# Patient Record
Sex: MALE | Race: WHITE | NOT HISPANIC OR LATINO | Employment: OTHER | URBAN - METROPOLITAN AREA
[De-identification: names, ages, dates, MRNs, and addresses within clinical notes are randomized per-mention and may not be internally consistent; named-entity substitution may affect disease eponyms.]

---

## 2019-05-18 ENCOUNTER — HOSPITAL ENCOUNTER (EMERGENCY)
Facility: HOSPITAL | Age: 74
Discharge: HOME | End: 2019-05-18
Attending: EMERGENCY MEDICINE
Payer: MEDICARE

## 2019-05-18 VITALS
BODY MASS INDEX: 26.98 KG/M2 | RESPIRATION RATE: 20 BRPM | WEIGHT: 178 LBS | DIASTOLIC BLOOD PRESSURE: 65 MMHG | OXYGEN SATURATION: 94 % | HEART RATE: 76 BPM | TEMPERATURE: 98 F | HEIGHT: 68 IN | SYSTOLIC BLOOD PRESSURE: 134 MMHG

## 2019-05-18 DIAGNOSIS — S61.210A LACERATION OF RIGHT INDEX FINGER WITHOUT FOREIGN BODY WITHOUT DAMAGE TO NAIL, INITIAL ENCOUNTER: Primary | ICD-10-CM

## 2019-05-18 PROCEDURE — 12002 RPR S/N/AX/GEN/TRNK2.6-7.5CM: CPT | Mod: F6,,, | Performed by: PHYSICIAN ASSISTANT

## 2019-05-18 PROCEDURE — 12002 RPR S/N/AX/GEN/TRNK2.6-7.5CM: CPT | Mod: F6

## 2019-05-18 PROCEDURE — 99281 EMR DPT VST MAYX REQ PHY/QHP: CPT | Mod: 25

## 2019-05-18 PROCEDURE — 90715 TDAP VACCINE 7 YRS/> IM: CPT | Performed by: EMERGENCY MEDICINE

## 2019-05-18 PROCEDURE — 12002 PR RESUP NPTERF WND BODY 2.6-7.5 CM: ICD-10-PCS | Mod: F6,,, | Performed by: PHYSICIAN ASSISTANT

## 2019-05-18 PROCEDURE — 99282 EMERGENCY DEPT VISIT SF MDM: CPT | Mod: 25

## 2019-05-18 PROCEDURE — 99284 EMERGENCY DEPT VISIT MOD MDM: CPT | Mod: 25,,, | Performed by: PHYSICIAN ASSISTANT

## 2019-05-18 PROCEDURE — 25000003 PHARM REV CODE 250: Performed by: PHYSICIAN ASSISTANT

## 2019-05-18 PROCEDURE — 99284 PR EMERGENCY DEPT VISIT,LEVEL IV: ICD-10-PCS | Mod: 25,,, | Performed by: PHYSICIAN ASSISTANT

## 2019-05-18 PROCEDURE — 90471 IMMUNIZATION ADMIN: CPT | Performed by: EMERGENCY MEDICINE

## 2019-05-18 PROCEDURE — 63600175 PHARM REV CODE 636 W HCPCS: Performed by: EMERGENCY MEDICINE

## 2019-05-18 RX ORDER — PRAMIPEXOLE DIHYDROCHLORIDE 0.5 MG/1
0.5 TABLET ORAL 3 TIMES DAILY
COMMUNITY

## 2019-05-18 RX ORDER — GABAPENTIN 100 MG/1
100 CAPSULE ORAL 3 TIMES DAILY
COMMUNITY

## 2019-05-18 RX ORDER — LIDOCAINE HYDROCHLORIDE 10 MG/ML
10 INJECTION INFILTRATION; PERINEURAL
Status: COMPLETED | OUTPATIENT
Start: 2019-05-18 | End: 2019-05-18

## 2019-05-18 RX ORDER — CARBIDOPA AND LEVODOPA 25; 250 MG/1; MG/1
1 TABLET, ORALLY DISINTEGRATING ORAL 3 TIMES DAILY
COMMUNITY

## 2019-05-18 RX ADMIN — LIDOCAINE HYDROCHLORIDE 1 ML: 10 INJECTION, SOLUTION INFILTRATION; PERINEURAL at 04:05

## 2019-05-18 RX ADMIN — CLOSTRIDIUM TETANI TOXOID ANTIGEN (FORMALDEHYDE INACTIVATED), CORYNEBACTERIUM DIPHTHERIAE TOXOID ANTIGEN (FORMALDEHYDE INACTIVATED), BORDETELLA PERTUSSIS TOXOID ANTIGEN (GLUTARALDEHYDE INACTIVATED), BORDETELLA PERTUSSIS FILAMENTOUS HEMAGGLUTININ ANTIGEN (FORMALDEHYDE INACTIVATED), BORDETELLA PERTUSSIS PERTACTIN ANTIGEN, AND BORDETELLA PERTUSSIS FIMBRIAE 2/3 ANTIGEN 0.5 ML: 5; 2; 2.5; 5; 3; 5 INJECTION, SUSPENSION INTRAMUSCULAR at 04:05

## 2019-05-18 NOTE — ED PROVIDER NOTES
Encounter Date: 5/18/2019       History     Chief Complaint   Patient presents with    Laceration     r  index finger, keeps bleeding on eliquis     Mr Flores is a 72 yo male that presents to the ED with laceration to right index finger. Pt reports that he cut his finger on the sharp edge of screen door handle. Pt is on eliquis and could not control bleeding at home. Pt has 3 cm laceration to radial side of right index finger.  Unable to recall last tetanus shot. Denies any fevers, chills, body aches, headaches, dizziness, light-headedness, chest pain, shortness of breath, abdominal pain, N/V/D, urinary symptoms.         Review of patient's allergies indicates:  No Known Allergies  Past Medical History:   Diagnosis Date    Blood clot in vein     Emphysema lung     Neuropathy     Subdural hematoma      Past Surgical History:   Procedure Laterality Date    BRAIN SURGERY      FOOT SURGERY       History reviewed. No pertinent family history.  Social History     Tobacco Use    Smoking status: Never Smoker    Smokeless tobacco: Never Used   Substance Use Topics    Alcohol use: Never     Frequency: Never    Drug use: Never     Review of Systems   Constitutional: Negative for chills and fever.   HENT: Negative for congestion, rhinorrhea and sore throat.    Eyes: Negative for visual disturbance.   Respiratory: Negative for cough and shortness of breath.    Cardiovascular: Negative for chest pain and leg swelling.   Gastrointestinal: Negative for abdominal pain, diarrhea, nausea and vomiting.   Genitourinary: Negative for dysuria, flank pain and frequency.   Musculoskeletal: Negative for back pain and neck pain.   Skin: Positive for wound.   Neurological: Negative for dizziness, syncope, light-headedness and headaches.   Hematological: Bruises/bleeds easily (on eliquis).   Psychiatric/Behavioral: Negative for confusion.       Physical Exam     Initial Vitals [05/18/19 1523]   BP Pulse Resp Temp SpO2   134/65 76 20 98  °F (36.7 °C) (!) 94 %      MAP       --         Physical Exam    Constitutional: He appears well-developed and well-nourished. He is cooperative.  Non-toxic appearance. No distress.   HENT:   Head: Normocephalic and atraumatic.   Eyes: Pupils are equal, round, and reactive to light.   Neck: Normal range of motion. Neck supple.   Cardiovascular: Normal rate. Exam reveals no gallop and no friction rub.    No murmur heard.  Pulmonary/Chest: Effort normal. No respiratory distress. He has no decreased breath sounds. He has no wheezes. He has no rhonchi. He has no rales.   Abdominal: Soft. There is no tenderness.   Musculoskeletal: Normal range of motion.        Left hand: He exhibits deformity and laceration. He exhibits normal range of motion and no swelling. Normal sensation noted.        Hands:  Neurological: He is alert and oriented to person, place, and time.   Skin: Skin is warm and dry.         ED Course   Lac Repair  Date/Time: 5/18/2019 5:00 PM  Performed by: Alec Claudio PA-C  Authorized by: Zhanna Desai MD   Body area: upper extremity  Location details: right index finger  Laceration length: 3 cm  Foreign bodies: no foreign bodies  Anesthesia: local infiltration    Anesthesia:  Local Anesthetic: lidocaine 1% without epinephrine  Anesthetic total: 5 mL  Preparation: Patient was prepped and draped in the usual sterile fashion.  Irrigation solution: saline  Irrigation method: jet lavage  Amount of cleaning: extensive  Skin closure: Ethilon (5-0)  Number of sutures: 3  Technique: simple  Approximation: close  Approximation difficulty: simple  Dressing: non-stick sterile dressing and pressure dressing  Patient tolerance: Patient tolerated the procedure well with no immediate complications        Labs Reviewed - No data to display       Imaging Results    None          Medical Decision Making:   Initial Assessment:   Mr Flores is a 74 yo male that presents to the ED with laceration to right index  finger. Pt reports that he cut his finger on the sharp edge of screen door handle. Pt is on eliquis and could not control bleeding at home. Pt has 3 cm laceration to radial side of right index finger.  Unable to recall last tetanus shot.  ED Management:  Pt hemodynamically stable. Tdap given. Laceration irrigated copiously with NS. No foreign bodies. Laceration repaired nad patient tolerated procedure without complications. Pressure dressing applied. Plan is to discharge patient and have him follow up with PCP in ten days for suture removal. Pt instructed to return if he develops increased bleeding, pain or any signs of infection. He was discharged with no new prescriptions.  He will follow up with PCP.  All of the patient's questions were answered.  I reviewed the patient's chart and discussed the case with my supervising physician.               Attending Attestation:     Physician Attestation Statement for NP/PA:   I have conducted a face to face encounter with this patient in addition to the NP/PA, due to Medical Complexity    Other NP/PA Attestation Additions:    History of Present Illness: 73-year-old man complains of right index finger laceration that he sustained earlier today on the edge of a screen gait handle.  Does not recall his last tetanus shot.   Physical Exam: There is a 3 cm curvilinear laceration on the radial edge of the proximal index finger near the MCP.  It is oozing blood.  Patient is distally neurovascularly intact. Motor function is intact.   Medical Decision Making: Tetanus was updated.  Laceration was repaired by the PA as documented above.  There was 1 area of the laceration that continue to ooze after laceration repair.  Pressure was held and a pressure dressing was placed.  It was recommended that he keep the dressing on until 24 hr from now.                    Clinical Impression:       ICD-10-CM ICD-9-CM   1. Laceration of right index finger without foreign body without damage to  nail, initial encounter S61.210A 883.0         Disposition:   Disposition: Discharged  Condition: Stable                        Alec Claudio PA-C  05/19/19 0153

## 2019-05-18 NOTE — ED NOTES
Patient identifiers verified and correct for Woo Flores  LOC: The patient is awake, alert and aware of environment with an appropriate affect, the patient is oriented x 3 and speaking appropriately.   APPEARANCE: Patient appears comfortable and in no acute distress, patient is clean and well groomed.  SKIN: The skin is warm and dry, color consistent with ethnicity, patient has normal skin turgor and moist mucus membranes, Laceration to R index finger noted  MUSCULOSKELETAL: Patient moving all extremities spontaneously, no swelling noted.  RESPIRATORY: Airway is open and patent, respirations are spontaneous, patient has a normal effort and rate, no accessory muscle use noted, O2 sats noted at 94% on room air.  CARDIAC:Denies chest pain capillary refill < 3 seconds.   GASTRO: Soft and non tender to palpation, no distention noted, normoactive bowel sounds present in all four quadrants. Pt states bowel movements have been regular.  : Pt denies any pain or frequency with urination.  NEURO: Pt opens eyes spontaneously, behavior appropriate to situation, follows commands, facial expression symmetrical, bilateral hand grasp equal and even, purposeful motor response noted, normal sensation in all extremities when touched with a finger.

## 2019-05-18 NOTE — DISCHARGE INSTRUCTIONS
Please follow up with your PCP in 10 days for wound check and suture removal. Please return to the Emergency Department if you develop increased pain, bleeding, fevers, chills, body aches or any additional concerning symptoms.

## 2019-05-18 NOTE — ED TRIAGE NOTES
Patient reports to the ED today with reports of R index finger laceration. Patient states that he cut his finger on a door latch. Patient on eliquis and states that it wont quit bleeding. Bleeding controlled at this time

## 2019-05-18 NOTE — ED NOTES
Discharge home with family, states understanding to follow up as directed. Ambulates out of ED without difficulty. Extra supplies given.

## 2020-01-01 ENCOUNTER — HOSPITAL ENCOUNTER (EMERGENCY)
Facility: HOSPITAL | Age: 75
End: 2020-07-01
Attending: EMERGENCY MEDICINE
Payer: MEDICARE

## 2020-01-01 VITALS — RESPIRATION RATE: 124 BRPM

## 2020-01-01 DIAGNOSIS — I46.9 CARDIAC ARREST: Primary | ICD-10-CM

## 2020-01-01 PROCEDURE — 99291 PR CRITICAL CARE, E/M 30-74 MINUTES: ICD-10-PCS | Mod: ,,, | Performed by: EMERGENCY MEDICINE

## 2020-01-01 PROCEDURE — 94770 HC EXHALED C02 TEST: CPT

## 2020-01-01 PROCEDURE — 99291 CRITICAL CARE FIRST HOUR: CPT | Mod: ,,, | Performed by: EMERGENCY MEDICINE

## 2020-01-01 PROCEDURE — 99291 CRITICAL CARE FIRST HOUR: CPT | Mod: 25

## 2020-01-01 PROCEDURE — 63600175 PHARM REV CODE 636 W HCPCS: Performed by: EMERGENCY MEDICINE

## 2020-01-01 PROCEDURE — 99900035 HC TECH TIME PER 15 MIN (STAT)

## 2020-01-01 PROCEDURE — 25000003 PHARM REV CODE 250: Performed by: EMERGENCY MEDICINE

## 2020-01-01 RX ORDER — EPINEPHRINE 0.1 MG/ML
INJECTION INTRAVENOUS CODE/TRAUMA/SEDATION MEDICATION
Status: COMPLETED | OUTPATIENT
Start: 2020-01-01 | End: 2020-01-01

## 2020-01-01 RX ORDER — CALCIUM CHLORIDE INJECTION 100 MG/ML
INJECTION, SOLUTION INTRAVENOUS CODE/TRAUMA/SEDATION MEDICATION
Status: COMPLETED | OUTPATIENT
Start: 2020-01-01 | End: 2020-01-01

## 2020-01-01 RX ORDER — SODIUM BICARBONATE 1 MEQ/ML
SYRINGE (ML) INTRAVENOUS CODE/TRAUMA/SEDATION MEDICATION
Status: COMPLETED | OUTPATIENT
Start: 2020-01-01 | End: 2020-01-01

## 2020-01-01 RX ADMIN — EPINEPHRINE 1 MG: 0.1 INJECTION, SOLUTION ENDOTRACHEAL; INTRACARDIAC; INTRAVENOUS at 05:07

## 2020-01-01 RX ADMIN — EPINEPHRINE 1 MG: 0.1 INJECTION, SOLUTION ENDOTRACHEAL; INTRACARDIAC; INTRAVENOUS at 06:07

## 2020-01-01 RX ADMIN — SODIUM BICARBONATE 50 MEQ: 84 INJECTION, SOLUTION INTRAVENOUS at 05:07

## 2020-01-01 RX ADMIN — CALCIUM CHLORIDE 1 G: 100 INJECTION, SOLUTION INTRAVENOUS at 05:07

## 2020-07-01 NOTE — CARE UPDATE
Pt arrived by EMS with chest compressions being performed, LMA in place and ventilated with ambu bag. Continued ventilating with ambu bag while chest compressions performed. EtCO2 ranging from 12-33. Pt became asystole and TOD announced by MD.

## 2020-07-01 NOTE — ED PROVIDER NOTES
Encounter Date: 7/1/2020       History     Chief Complaint   Patient presents with    Unresponsive     Pt presents to ER via EMS for unresponsive. Wife found patient at 0100 unresponsive. EMS arrived and patient was breathing with palpable pulse; they lost pulse in route. He presented on LUCUS; ems gave 2 epi and 1 bicarb PTA.     74-year-old male with a history of COPD presents via EMS in cardiac arrest.  Patient was unresponsive at home.  He wears oxygen device that was apparently disconnected.  EMS began CPR and administered epinephrine twice.  Upon arrival in the ED patient is pulseless being bagged via a LMA.  Wife presents at bedside midway through the code to supplement history.  Patient had to be intubated once for COPD exacerbation.  She states that he would not want to be intubated again.  Unable to get review of systems secondary to cardiac arrest.        Review of patient's allergies indicates:  No Known Allergies  Past Medical History:   Diagnosis Date    Blood clot in vein     Emphysema lung     Neuropathy     Subdural hematoma      Past Surgical History:   Procedure Laterality Date    BRAIN SURGERY      FOOT SURGERY       No family history on file.  Social History     Tobacco Use    Smoking status: Never Smoker    Smokeless tobacco: Never Used   Substance Use Topics    Alcohol use: Never     Frequency: Never    Drug use: Never     Review of Systems   Unable to perform ROS: Acuity of condition       Physical Exam     Initial Vitals [07/01/20 0611]   BP Pulse Resp Temp SpO2   -- (!) 0 (!) 124 -- --      MAP       --         Physical Exam    Constitutional: He appears well-developed and well-nourished. He is not diaphoretic. No distress.   HENT:   Head: Normocephalic and atraumatic.   Nose: Nose normal.   Neck: Neck supple.   Cardiovascular:   No femoral pulse on doppler   Genitourinary:    Penis normal.     Musculoskeletal: No edema.   Neurological:   Cardiac arrest   Skin: Skin is warm and  dry. No rash noted. No erythema.   Psychiatric:   Cardiac arrest         ED Course   Procedures  Labs Reviewed - No data to display       Imaging Results    None       X-Rays:   Independently Interpreted Readings:   Other Readings:  Bedside Echo: no tamponade or significant effusion, no cardiac activity at time of death    Medical Decision Making:   History:   I obtained history from: EMS provider and someone other than patient.  Old Medical Records: I decided to obtain old medical records.  Independently Interpreted Test(s):   I have ordered and independently interpreted X-rays - see prior notes.  Clinical Tests:   Radiological Study: Ordered and Reviewed  ED Management:  74-year-old male presents in cardiac arrest.  Patient given 5 rounds of epinephrine as well as bicarb and calcium.  Despite these efforts unable to regain a pulse.  Patient was persistently and PE a and then transition to asystole.  Time of death 0611.  No cardiac activity on bedside echo at time of death.  Updated wife continuously during the 2nd half of the code.  Updated daughter upon her arrival.              Attending Attestation:         Attending Critical Care:   Critical Care Times:   Direct Patient Care (initial evaluation, reassessments, and time considering the case)................................................................25 minutes.   Documentation..................................................................................................................................................................................5 minutes.   Consultation with the patient's family directly relating to the patient's condition, care, and DNR status (when patient unable)......2 minutes.   Other..................................................................................................................................................................................................3 minutes.    ==============================================================  · Total Critical Care Time - exclusive of procedural time: 35 minutes.  ==============================================================  Critical care was necessary to treat or prevent imminent or life-threatening deterioration of the following conditions: cardiac arrest.   The following critical care procedures were done by me (see procedure notes): CPR *.   Critical care was time spent personally by me on the following activities: obtaining history from patient or relative, examination of patient, development of treatment plan with patient or relative, ordering and performing treatments and interventions, evaluation of patient's response to treatment, interpretation of cardiac measurements, re-evaluation of patient's conition and end of life discussions.   Critical Care Condition: critical                             Clinical Impression:   Final diagnoses:  [I46.9] Cardiac arrest (Primary)      Disposition:   Disposition:   Condition: Critical     ED Disposition Condition                               Gee Mcdowell MD  20 0624

## 2020-07-01 NOTE — ED NOTES
Contacted [facility]  Office. Spoke with Tana GAMBOA on call time within 15 minutes. Felisa Campbell will be contacting in approximately fifteen minutes.

## 2020-07-01 NOTE — ED NOTES
Contacted Felisa Campbell with [facility]. Will call back with any additional questions/concerns. Will give family some time.

## 2020-07-01 NOTE — ED NOTES
MICHAELLE made aware of patient being transferred to the [facility].

## 2020-07-01 NOTE — ED NOTES
Called [facility]:  Screener: Na Wilson ([phone]).  Suitable for tissue and eye donation